# Patient Record
Sex: MALE | Race: WHITE | NOT HISPANIC OR LATINO | Employment: OTHER | URBAN - NONMETROPOLITAN AREA
[De-identification: names, ages, dates, MRNs, and addresses within clinical notes are randomized per-mention and may not be internally consistent; named-entity substitution may affect disease eponyms.]

---

## 2022-11-14 PROCEDURE — 93308 TTE F-UP OR LMTD: CPT | Performed by: INTERNAL MEDICINE

## 2022-11-14 PROCEDURE — 93321 DOPPLER ECHO F-UP/LMTD STD: CPT | Performed by: INTERNAL MEDICINE

## 2022-11-14 PROCEDURE — 93325 DOPPLER ECHO COLOR FLOW MAPG: CPT | Performed by: INTERNAL MEDICINE

## 2022-11-18 ENCOUNTER — OUTSIDE FACILITY SERVICE (OUTPATIENT)
Dept: CARDIOLOGY | Facility: CLINIC | Age: 67
End: 2022-11-18

## 2022-12-01 ENCOUNTER — OUTSIDE FACILITY SERVICE (OUTPATIENT)
Dept: CARDIOLOGY | Facility: CLINIC | Age: 67
End: 2022-12-01

## 2022-12-01 PROCEDURE — 93227 XTRNL ECG REC<48 HR R&I: CPT | Performed by: INTERNAL MEDICINE

## 2023-01-23 ENCOUNTER — OFFICE VISIT (OUTPATIENT)
Dept: CARDIOLOGY | Facility: CLINIC | Age: 68
End: 2023-01-23
Payer: MEDICARE

## 2023-01-23 VITALS
SYSTOLIC BLOOD PRESSURE: 172 MMHG | DIASTOLIC BLOOD PRESSURE: 94 MMHG | WEIGHT: 315 LBS | HEART RATE: 72 BPM | BODY MASS INDEX: 40.43 KG/M2 | HEIGHT: 74 IN

## 2023-01-23 DIAGNOSIS — I48.19 ATRIAL FIBRILLATION, PERSISTENT: Primary | ICD-10-CM

## 2023-01-23 DIAGNOSIS — E11.8 DM (DIABETES MELLITUS), TYPE 2 WITH COMPLICATIONS: ICD-10-CM

## 2023-01-23 DIAGNOSIS — E66.01 MORBID OBESITY WITH BMI OF 45.0-49.9, ADULT: ICD-10-CM

## 2023-01-23 DIAGNOSIS — E78.5 HYPERLIPIDEMIA LDL GOAL <100: ICD-10-CM

## 2023-01-23 DIAGNOSIS — R06.02 SHORTNESS OF BREATH: ICD-10-CM

## 2023-01-23 DIAGNOSIS — I20.8 ANGINAL EQUIVALENT: ICD-10-CM

## 2023-01-23 DIAGNOSIS — I25.6 SILENT MYOCARDIAL ISCHEMIA: ICD-10-CM

## 2023-01-23 DIAGNOSIS — R94.31 ABNORMAL EKG: ICD-10-CM

## 2023-01-23 DIAGNOSIS — I10 PRIMARY HYPERTENSION: ICD-10-CM

## 2023-01-23 PROCEDURE — 93000 ELECTROCARDIOGRAM COMPLETE: CPT | Performed by: NURSE PRACTITIONER

## 2023-01-23 PROCEDURE — 99204 OFFICE O/P NEW MOD 45 MIN: CPT | Performed by: NURSE PRACTITIONER

## 2023-01-23 RX ORDER — FUROSEMIDE 40 MG/1
40 TABLET ORAL DAILY
COMMUNITY

## 2023-01-23 RX ORDER — AMLODIPINE BESYLATE 10 MG/1
10 TABLET ORAL DAILY
Qty: 90 TABLET | Refills: 3 | Status: SHIPPED | OUTPATIENT
Start: 2023-01-23

## 2023-01-23 RX ORDER — IRBESARTAN 300 MG/1
300 TABLET ORAL NIGHTLY
COMMUNITY

## 2023-01-23 RX ORDER — ASPIRIN 81 MG/1
81 TABLET ORAL DAILY
COMMUNITY

## 2023-01-23 RX ORDER — LOVASTATIN 40 MG/1
40 TABLET ORAL NIGHTLY
COMMUNITY

## 2023-01-23 RX ORDER — TAMSULOSIN HYDROCHLORIDE 0.4 MG/1
1 CAPSULE ORAL DAILY
COMMUNITY

## 2023-01-23 RX ORDER — SOTALOL HYDROCHLORIDE 80 MG/1
TABLET ORAL
Qty: 180 TABLET | Refills: 2 | Status: SHIPPED | OUTPATIENT
Start: 2023-01-23 | End: 2023-03-27 | Stop reason: DRUGHIGH

## 2023-01-23 RX ORDER — SPIRONOLACTONE 25 MG/1
25 TABLET ORAL DAILY
Qty: 90 TABLET | Refills: 3 | Status: SHIPPED | OUTPATIENT
Start: 2023-01-23

## 2023-01-23 RX ORDER — CARVEDILOL 25 MG/1
25 TABLET ORAL 2 TIMES DAILY WITH MEALS
COMMUNITY
End: 2023-01-23

## 2023-01-23 RX ORDER — AMLODIPINE BESYLATE 5 MG/1
5 TABLET ORAL DAILY
COMMUNITY
End: 2023-01-23

## 2023-01-23 NOTE — LETTER
January 23, 2023     Mandie Anaya MD  [address]  [facility] [address]    Patient: Sourav Daley   YOB: 1955   Date of Visit: 1/23/2023       Dear Mandie Anaya MD    Sourav Daley was in my office today. Below is a copy of my note.    If you have questions, please do not hesitate to call me. I look forward to following Sourav along with you.         Sincerely,        RADHA Ramirez        CC: No Recipients    Chief Complaint   Patient presents with   • Establish Care     Pt is here to establish care.  He states he had COVID in November 2022.  He states he developed a-fib afterwards.  Pt states he does have SOB, with activity.  He denies CP, SOB or dizziness.  Pt does take a daily ASA.   • Cardiac History     Pt denies ever being evaluated by cardiology.  He had echo and  holter on 11/14/22.     • Lab Work     Last labs 11/8/22       Cardiac Complaints  dyspnea      Subjective    Sourav Daley is a 67 y.o. male with PAF, HTN, DM, and hyperlipidemia. He is referred today after he developed atrial fibrillation in November of 2022, eliquis initiated. At that time, 2 day holter was worn, which revealed 100% atrial fibrillation burden with several runs of afib with RVR. Echo done at [facility] showed EF of 65%, diastolic dysfunction, no effusion, and LA enlargement. He does report some SOA with over exertion but denies cardiac concerns with normal activity. CP, dizziness, syncope denied.  He states he can not feel that his heart is out of rhythm. Wife who accompanies him admits he snores quite a bit and will often wake up gasping. He denies any sleep apnea workup. No prior cardiac workup as been done. He does admit his mother had history of HTN and CVA. Patient reports he has hard to control blood pressure for many years. Labs done with PCP 08/2022 revealed: , Creatinine 0.88, GFR >60, Na 139, K 4.2, HH 13.9/40.9, Platelet 240, TRIG 167, HDL 32, LDL 62, PSA 5.1, TSH 4.38. Bleed and  bruise denied on current eliquis therapy. Also reports use of daily ASA without problems.         Cardiac History  Past Surgical History:   Procedure Laterality Date   • APPENDECTOMY     • CONVERTED (HISTORICAL) HOLTER  11/14/2022    Baseline afib, AVR HR 84, mult runs of afib with RVR, no pausing   • ECHO - CONVERTED  11/14/2022    EF 65%, diastolic dysfunction, LA enlargement, no effusion   • ORIF FINGER / THUMB FRACTURE     • TOE SURGERY Left    • TONSILLECTOMY         Current Outpatient Medications   Medication Sig Dispense Refill   • apixaban (ELIQUIS) 5 MG tablet tablet Take 5 mg by mouth 2 (Two) Times a Day.     • aspirin 81 MG EC tablet Take 81 mg by mouth Daily.     • Ferrous Sulfate (Iron) 28 MG tablet Take  by mouth.     • furosemide (LASIX) 40 MG tablet Take 40 mg by mouth Daily.     • irbesartan (AVAPRO) 300 MG tablet Take 300 mg by mouth Every Night.     • lovastatin (MEVACOR) 40 MG tablet Take 40 mg by mouth Every Night.     • metFORMIN (GLUCOPHAGE) 500 MG tablet Take 500 mg by mouth 2 (Two) Times a Day With Meals.     • tamsulosin (FLOMAX) 0.4 MG capsule 24 hr capsule Take 1 capsule by mouth Daily.     • amLODIPine (NORVASC) 10 MG tablet Take 1 tablet by mouth Daily. 90 tablet 3   • sotalol (Betapace) 80 MG tablet 1/2 tablet  tablet 2   • spironolactone (ALDACTONE) 25 MG tablet Take 1 tablet by mouth Daily. 90 tablet 3     No current facility-administered medications for this visit.       Patient has no known allergies.    Past Medical History:   Diagnosis Date   • Diabetes (HCC)    • HLD (hyperlipidemia)    • HTN (hypertension)        Social History     Socioeconomic History   • Marital status:    Tobacco Use   • Smoking status: Never   • Smokeless tobacco: Never   Vaping Use   • Vaping Use: Never used   Substance and Sexual Activity   • Alcohol use: Never   • Drug use: Never       Family History   Problem Relation Age of Onset   • Hypertension Mother    • Stroke Mother    • Dementia  "Mother    • Prostate cancer Father    • No Known Problems Sister    • No Known Problems Sister    • No Known Problems Brother    • No Known Problems Brother    • Diabetes Daughter    • No Known Problems Daughter    • No Known Problems Daughter        Review of Systems   Constitutional: Positive for malaise/fatigue. Negative for night sweats.   Cardiovascular: Positive for dyspnea on exertion. Negative for chest pain, irregular heartbeat, leg swelling, near-syncope, orthopnea, palpitations and syncope.   Respiratory: Negative for cough, shortness of breath and wheezing.    Musculoskeletal: Positive for arthritis, back pain, joint pain and stiffness.   Gastrointestinal: Negative for anorexia, heartburn, nausea and vomiting.   Genitourinary: Negative for dysuria, hematuria, hesitancy and nocturia.   Neurological: Negative for dizziness, headaches and light-headedness.   Psychiatric/Behavioral: Negative for depression and memory loss. The patient is not nervous/anxious.            Objective      /94 (BP Location: Right arm, Patient Position: Sitting)   Pulse 72   Ht 188 cm (74\")   Wt (!) 166 kg (365 lb 3.2 oz)   BMI 46.89 kg/m²     Constitutional:       Appearance: Not in distress.   Eyes:      Pupils: Pupils are equal, round, and reactive to light.   HENT:      Nose: Nose normal.   Pulmonary:      Effort: Pulmonary effort is normal.      Breath sounds: Normal breath sounds.   Cardiovascular:      PMI at left midclavicular line. Normal rate. Irregular rhythm.      Murmurs: There is a systolic murmur.   Abdominal:      Palpations: Abdomen is soft.   Musculoskeletal: Normal range of motion.      Cervical back: Normal range of motion and neck supple. Skin:     General: Skin is warm and dry.   Neurological:      Mental Status: Alert.           ECG 12 Lead    Date/Time: 1/23/2023 5:03 PM  Performed by: Geraldine Mayfield APRN  Authorized by: Geraldine Mayfield APRN   Previous ECG: no previous ECG available  Rhythm: " atrial fibrillation  BPM: 72  Other findings comments: q wave V3/V4    Clinical impression: abnormal EKG  Comments: Normal QT                Diagnoses and all orders for this visit:    1. Atrial fibrillation, persistent (HCC) (Primary)  -     ECG 12 Lead; Future  -     Stress Test With Myocardial Perfusion One Day; Future    2. Abnormal EKG  -     Stress Test With Myocardial Perfusion One Day; Future    3. Shortness of breath  -     Stress Test With Myocardial Perfusion One Day; Future    4. Silent myocardial ischemia  -     Stress Test With Myocardial Perfusion One Day; Future    5. Anginal equivalent (HCC)  -     Stress Test With Myocardial Perfusion One Day; Future    6. Primary hypertension    7. Hyperlipidemia LDL goal <100    8. DM (diabetes mellitus), type 2 with complications ([facility])    9. Morbid obesity with BMI of 45.0-49.9, adult ([facility])    Other orders  -     sotalol (Betapace) 80 MG tablet; 1/2 tablet BID  Dispense: 180 tablet; Refill: 2  -     amLODIPine (NORVASC) 10 MG tablet; Take 1 tablet by mouth Daily.  Dispense: 90 tablet; Refill: 3  -     spironolactone (ALDACTONE) 25 MG tablet; Take 1 tablet by mouth Daily.  Dispense: 90 tablet; Refill: 3  -     ECG 12 Lead            Afib: EKG done today showed an atrial fibrillation with controlled rate and ? q wave in V3/V4 and normal QT. Stress testing will be recommended to assess for any ischemia causing rhythm concerns. 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 ASA continued, more recommendations to follow stress. Coreg therapy discontinued in favor of sotalol at 40mg BID. Outpatient EKG to be done with your office on Thursday. Please fax to our office for review. Eliquis continued at same.     HTN: BP is not at goal. We will urge to increase norvasc therapy to 10mg daily. Avapro continued at same. We will add aldactone therapy at 25mg daily. Limited sodium urged. STRONGLY  recommended to discuss overnight oximetry with you as he has many risk factors for sleep apnea, and this could be a contributing factor in his hard to control BP and his arrhythmia. Will discuss with you on Thursday.     Hyperlipidemia: Recent labs showed lipids at goal. Will continue on same mevacor therapy. Limited carb diet advised.     Diastolic dysfunction: Will continue with lasix therapy. Limited sodium urged. Edema denied.     DM: No recent AIC available. He does admit AIC higher in the past, but no recent available. He is on glucophage therapy and tolerates well. Limited carb diet urged. Was also encouraged to discuss ozempic or monjouro therapy with you as this would aid in weight loss as BMI is markedly elevated at over 40.    BMI noted at 46.89, good cardiac ADA diet advised.     6 month follow up advised, or sooner if needed.       Problems Addressed this Visit        Cardiac and Vasculature    Atrial fibrillation, persistent (HCC) - Primary    Relevant Medications    sotalol (Betapace) 80 MG tablet    amLODIPine (NORVASC) 10 MG tablet    Other Relevant Orders    ECG 12 Lead    Stress Test With Myocardial Perfusion One Day    Primary hypertension    Relevant Medications    irbesartan (AVAPRO) 300 MG tablet    furosemide (LASIX) 40 MG tablet    sotalol (Betapace) 80 MG tablet    amLODIPine (NORVASC) 10 MG tablet    spironolactone (ALDACTONE) 25 MG tablet    Hyperlipidemia LDL goal <100    Relevant Medications    lovastatin (MEVACOR) 40 MG tablet   Other Visit Diagnoses     Abnormal EKG        Relevant Orders    Stress Test With Myocardial Perfusion One Day    Shortness of breath        Relevant Orders    Stress Test With Myocardial Perfusion One Day    Silent myocardial ischemia        Relevant Medications    sotalol (Betapace) 80 MG tablet    amLODIPine (NORVASC) 10 MG tablet    Other Relevant Orders    Stress Test With Myocardial Perfusion One Day    Anginal equivalent (HCC)        Relevant Medications     sotalol (Betapace) 80 MG tablet    amLODIPine (NORVASC) 10 MG tablet    Other Relevant Orders    Stress Test With Myocardial Perfusion One Day    DM (diabetes mellitus), type 2 with complications (HCC)        Relevant Medications    metFORMIN (GLUCOPHAGE) 500 MG tablet    Morbid obesity with BMI of 45.0-49.9, adult (HCC)          Diagnoses       Codes Comments    Atrial fibrillation, persistent (HCC)    -  Primary ICD-10-CM: I48.19  ICD-9-CM: 427.31     Abnormal EKG     ICD-10-CM: R94.31  ICD-9-CM: 794.31     Shortness of breath     ICD-10-CM: R06.02  ICD-9-CM: 786.05     Silent myocardial ischemia     ICD-10-CM: I25.6  ICD-9-CM: 414.8     Anginal equivalent (HCC)     ICD-10-CM: I20.8  ICD-9-CM: 413.9     Primary hypertension     ICD-10-CM: I10  ICD-9-CM: 401.9     Hyperlipidemia LDL goal <100     ICD-10-CM: E78.5  ICD-9-CM: 272.4     DM (diabetes mellitus), type 2 with complications (HCC)     ICD-10-CM: E11.8  ICD-9-CM: 250.90     Morbid obesity with BMI of 45.0-49.9, adult (HCC)     ICD-10-CM: E66.01, Z68.42  ICD-9-CM: 278.01, V85.42                        Electronically signed by RADHA Ramirez January 23, 2023 17:05 EST

## 2023-01-23 NOTE — LETTER
January 23, 2023     RADHA Lynn  [address]  [facility] [address]    Patient: Sourav Daley   YOB: 1955   Date of Visit: 1/23/2023       Dear RADHA Lynn    Sourav Daley was in my office today. Below is a copy of my note.    If you have questions, please do not hesitate to call me. I look forward to following Sourav along with you.         Sincerely,        RADHA Ramirez        CC: No Recipients    Chief Complaint   Patient presents with   • Establish Care     Pt is here to establish care.  He states he had COVID in November 2022.  He states he developed a-fib afterwards.  Pt states he does have SOB, with activity.  He denies CP, SOB or dizziness.  Pt does take a daily ASA.   • Cardiac History     Pt denies ever being evaluated by cardiology.  He had echo and  holter on 11/14/22.     • Lab Work     Last labs 11/8/22       Cardiac Complaints  dyspnea      Subjective    Sourav Daley is a 67 y.o. male with PAF, HTN, DM, and hyperlipidemia. He is referred today after he developed atrial fibrillation in November of 2022, eliquis initiated. At that time, 2 day holter was worn, which revealed 100% atrial fibrillation burden with several runs of afib with RVR. Echo done at [facility] showed EF of 65%, diastolic dysfunction, no effusion, and LA enlargement. He does report some SOA with over exertion but denies cardiac concerns with normal activity. CP, dizziness, syncope denied.  He states he can not feel that his heart is out of rhythm. Wife who accompanies him admits he snores quite a bit and will often wake up gasping. He denies any sleep apnea workup. No prior cardiac workup as been done. He does admit his mother had history of HTN and CVA. Patient reports he has hard to control blood pressure for many years. Labs done with PCP 08/2022 revealed: , Creatinine 0.88, GFR >60, Na 139, K 4.2, HH 13.9/40.9, Platelet 240, TRIG 167, HDL 32, LDL 62, PSA 5.1, TSH 4.38. Bleed and bruise  denied on current eliquis therapy. Also reports use of daily ASA without problems.         Cardiac History  Past Surgical History:   Procedure Laterality Date   • APPENDECTOMY     • CONVERTED (HISTORICAL) HOLTER  11/14/2022    Baseline afib, AVR HR 84, mult runs of afib with RVR, no pausing   • ECHO - CONVERTED  11/14/2022    EF 65%, diastolic dysfunction, LA enlargement, no effusion   • ORIF FINGER / THUMB FRACTURE     • TOE SURGERY Left    • TONSILLECTOMY         Current Outpatient Medications   Medication Sig Dispense Refill   • apixaban (ELIQUIS) 5 MG tablet tablet Take 5 mg by mouth 2 (Two) Times a Day.     • aspirin 81 MG EC tablet Take 81 mg by mouth Daily.     • Ferrous Sulfate (Iron) 28 MG tablet Take  by mouth.     • furosemide (LASIX) 40 MG tablet Take 40 mg by mouth Daily.     • irbesartan (AVAPRO) 300 MG tablet Take 300 mg by mouth Every Night.     • lovastatin (MEVACOR) 40 MG tablet Take 40 mg by mouth Every Night.     • metFORMIN (GLUCOPHAGE) 500 MG tablet Take 500 mg by mouth 2 (Two) Times a Day With Meals.     • tamsulosin (FLOMAX) 0.4 MG capsule 24 hr capsule Take 1 capsule by mouth Daily.     • amLODIPine (NORVASC) 10 MG tablet Take 1 tablet by mouth Daily. 90 tablet 3   • sotalol (Betapace) 80 MG tablet 1/2 tablet  tablet 2   • spironolactone (ALDACTONE) 25 MG tablet Take 1 tablet by mouth Daily. 90 tablet 3     No current facility-administered medications for this visit.       Patient has no known allergies.    Past Medical History:   Diagnosis Date   • Diabetes (HCC)    • HLD (hyperlipidemia)    • HTN (hypertension)        Social History     Socioeconomic History   • Marital status:    Tobacco Use   • Smoking status: Never   • Smokeless tobacco: Never   Vaping Use   • Vaping Use: Never used   Substance and Sexual Activity   • Alcohol use: Never   • Drug use: Never       Family History   Problem Relation Age of Onset   • Hypertension Mother    • Stroke Mother    • Dementia Mother   "  • Prostate cancer Father    • No Known Problems Sister    • No Known Problems Sister    • No Known Problems Brother    • No Known Problems Brother    • Diabetes Daughter    • No Known Problems Daughter    • No Known Problems Daughter        Review of Systems   Constitutional: Positive for malaise/fatigue. Negative for night sweats.   Cardiovascular: Positive for dyspnea on exertion. Negative for chest pain, irregular heartbeat, leg swelling, near-syncope, orthopnea, palpitations and syncope.   Respiratory: Negative for cough, shortness of breath and wheezing.    Musculoskeletal: Positive for arthritis, back pain, joint pain and stiffness.   Gastrointestinal: Negative for anorexia, heartburn, nausea and vomiting.   Genitourinary: Negative for dysuria, hematuria, hesitancy and nocturia.   Neurological: Negative for dizziness, headaches and light-headedness.   Psychiatric/Behavioral: Negative for depression and memory loss. The patient is not nervous/anxious.            Objective      /94 (BP Location: Right arm, Patient Position: Sitting)   Pulse 72   Ht 188 cm (74\")   Wt (!) 166 kg (365 lb 3.2 oz)   BMI 46.89 kg/m²     Constitutional:       Appearance: Not in distress.   Eyes:      Pupils: Pupils are equal, round, and reactive to light.   HENT:      Nose: Nose normal.   Pulmonary:      Effort: Pulmonary effort is normal.      Breath sounds: Normal breath sounds.   Cardiovascular:      PMI at left midclavicular line. Normal rate. Irregular rhythm.      Murmurs: There is a systolic murmur.   Abdominal:      Palpations: Abdomen is soft.   Musculoskeletal: Normal range of motion.      Cervical back: Normal range of motion and neck supple. Skin:     General: Skin is warm and dry.   Neurological:      Mental Status: Alert.           ECG 12 Lead    Date/Time: 1/23/2023 5:03 PM  Performed by: Geraldine Mayfield APRN  Authorized by: Geraldine Mayfield APRN   Previous ECG: no previous ECG available  Rhythm: atrial " fibrillation  BPM: 72  Other findings comments: q wave V3/V4    Clinical impression: abnormal EKG  Comments: Normal QT                Diagnoses and all orders for this visit:    1. Atrial fibrillation, persistent (HCC) (Primary)  -     ECG 12 Lead; Future  -     Stress Test With Myocardial Perfusion One Day; Future    2. Abnormal EKG  -     Stress Test With Myocardial Perfusion One Day; Future    3. Shortness of breath  -     Stress Test With Myocardial Perfusion One Day; Future    4. Silent myocardial ischemia  -     Stress Test With Myocardial Perfusion One Day; Future    5. Anginal equivalent (HCC)  -     Stress Test With Myocardial Perfusion One Day; Future    6. Primary hypertension    7. Hyperlipidemia LDL goal <100    8. DM (diabetes mellitus), type 2 with complications ([facility])    9. Morbid obesity with BMI of 45.0-49.9, adult ([facility])    Other orders  -     sotalol (Betapace) 80 MG tablet; 1/2 tablet BID  Dispense: 180 tablet; Refill: 2  -     amLODIPine (NORVASC) 10 MG tablet; Take 1 tablet by mouth Daily.  Dispense: 90 tablet; Refill: 3  -     spironolactone (ALDACTONE) 25 MG tablet; Take 1 tablet by mouth Daily.  Dispense: 90 tablet; Refill: 3  -     ECG 12 Lead            Afib: EKG done today showed an atrial fibrillation with controlled rate and ? q wave in V3/V4 and normal QT. Stress testing will be recommended to assess for any ischemia causing rhythm concerns. 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 ASA continued, more recommendations to follow stress. Coreg therapy discontinued in favor of sotalol at 40mg BID. Outpatient EKG to be done with your office on Thursday. Please fax to our office for review. Eliquis continued at same.     HTN: BP is not at goal. We will urge to increase norvasc therapy to 10mg daily. Avapro continued at same. We will add aldactone therapy at 25mg daily. Limited sodium urged. STRONGLY recommended to  discuss overnight oximetry with you as he has many risk factors for sleep apnea, and this could be a contributing factor in his hard to control BP and his arrhythmia. Will discuss with you on Thursday.     Hyperlipidemia: Recent labs showed lipids at goal. Will continue on same mevacor therapy. Limited carb diet advised.     Diastolic dysfunction: Will continue with lasix therapy. Limited sodium urged. Edema denied.     DM: No recent AIC available. He does admit AIC higher in the past, but no recent available. He is on glucophage therapy and tolerates well. Limited carb diet urged. Was also encouraged to discuss ozempic or monjouro therapy with you as this would aid in weight loss as BMI is markedly elevated at over 40.    BMI noted at 46.89, good cardiac ADA diet advised.     6 month follow up advised, or sooner if needed.       Problems Addressed this Visit        Cardiac and Vasculature    Atrial fibrillation, persistent (HCC) - Primary    Relevant Medications    sotalol (Betapace) 80 MG tablet    amLODIPine (NORVASC) 10 MG tablet    Other Relevant Orders    ECG 12 Lead    Stress Test With Myocardial Perfusion One Day    Primary hypertension    Relevant Medications    irbesartan (AVAPRO) 300 MG tablet    furosemide (LASIX) 40 MG tablet    sotalol (Betapace) 80 MG tablet    amLODIPine (NORVASC) 10 MG tablet    spironolactone (ALDACTONE) 25 MG tablet    Hyperlipidemia LDL goal <100    Relevant Medications    lovastatin (MEVACOR) 40 MG tablet   Other Visit Diagnoses     Abnormal EKG        Relevant Orders    Stress Test With Myocardial Perfusion One Day    Shortness of breath        Relevant Orders    Stress Test With Myocardial Perfusion One Day    Silent myocardial ischemia        Relevant Medications    sotalol (Betapace) 80 MG tablet    amLODIPine (NORVASC) 10 MG tablet    Other Relevant Orders    Stress Test With Myocardial Perfusion One Day    Anginal equivalent (HCC)        Relevant Medications    sotalol  (Betapace) 80 MG tablet    amLODIPine (NORVASC) 10 MG tablet    Other Relevant Orders    Stress Test With Myocardial Perfusion One Day    DM (diabetes mellitus), type 2 with complications (HCC)        Relevant Medications    metFORMIN (GLUCOPHAGE) 500 MG tablet    Morbid obesity with BMI of 45.0-49.9, adult (HCC)          Diagnoses       Codes Comments    Atrial fibrillation, persistent (HCC)    -  Primary ICD-10-CM: I48.19  ICD-9-CM: 427.31     Abnormal EKG     ICD-10-CM: R94.31  ICD-9-CM: 794.31     Shortness of breath     ICD-10-CM: R06.02  ICD-9-CM: 786.05     Silent myocardial ischemia     ICD-10-CM: I25.6  ICD-9-CM: 414.8     Anginal equivalent (HCC)     ICD-10-CM: I20.8  ICD-9-CM: 413.9     Primary hypertension     ICD-10-CM: I10  ICD-9-CM: 401.9     Hyperlipidemia LDL goal <100     ICD-10-CM: E78.5  ICD-9-CM: 272.4     DM (diabetes mellitus), type 2 with complications (HCC)     ICD-10-CM: E11.8  ICD-9-CM: 250.90     Morbid obesity with BMI of 45.0-49.9, adult (HCC)     ICD-10-CM: E66.01, Z68.42  ICD-9-CM: 278.01, V85.42                        Electronically signed by RADHA Ramirez January 23, 2023 17:05 EST

## 2023-01-23 NOTE — PROGRESS NOTES
Chief Complaint   Patient presents with   • Establish Care     Pt is here to establish care.  He states he had COVID in November 2022.  He states he developed a-fib afterwards.  Pt states he does have SOB, with activity.  He denies CP, SOB or dizziness.  Pt does take a daily ASA.   • Cardiac History     Pt denies ever being evaluated by cardiology.  He had echo and  holter on 11/14/22.     • Lab Work     Last labs 11/8/22       Cardiac Complaints  dyspnea      Subjective   Sourav Daley is a 67 y.o. male with PAF, HTN, DM, and hyperlipidemia. He is referred today after he developed atrial fibrillation in November of 2022, eliquis initiated. At that time, 2 day holter was worn, which revealed 100% atrial fibrillation burden with several runs of afib with RVR. Echo done at [facility] showed EF of 65%, diastolic dysfunction, no effusion, and LA enlargement. He does report some SOA with over exertion but denies cardiac concerns with normal activity. CP, dizziness, syncope denied.  He states he can not feel that his heart is out of rhythm. Wife who accompanies him admits he snores quite a bit and will often wake up gasping. He denies any sleep apnea workup. No prior cardiac workup as been done. He does admit his mother had history of HTN and CVA. Patient reports he has hard to control blood pressure for many years. Labs done with PCP 08/2022 revealed: , Creatinine 0.88, GFR >60, Na 139, K 4.2, HH 13.9/40.9, Platelet 240, TRIG 167, HDL 32, LDL 62, PSA 5.1, TSH 4.38. Bleed and bruise denied on current eliquis therapy. Also reports use of daily ASA without problems.         Cardiac History  Past Surgical History:   Procedure Laterality Date   • APPENDECTOMY     • CONVERTED (HISTORICAL) HOLTER  11/14/2022    Baseline afib, AVR HR 84, mult runs of afib with RVR, no pausing   • ECHO - CONVERTED  11/14/2022    EF 65%, diastolic dysfunction, LA enlargement, no effusion   • ORIF FINGER / THUMB FRACTURE     • TOE SURGERY Left    •  TONSILLECTOMY         Current Outpatient Medications   Medication Sig Dispense Refill   • apixaban (ELIQUIS) 5 MG tablet tablet Take 5 mg by mouth 2 (Two) Times a Day.     • aspirin 81 MG EC tablet Take 81 mg by mouth Daily.     • Ferrous Sulfate (Iron) 28 MG tablet Take  by mouth.     • furosemide (LASIX) 40 MG tablet Take 40 mg by mouth Daily.     • irbesartan (AVAPRO) 300 MG tablet Take 300 mg by mouth Every Night.     • lovastatin (MEVACOR) 40 MG tablet Take 40 mg by mouth Every Night.     • metFORMIN (GLUCOPHAGE) 500 MG tablet Take 500 mg by mouth 2 (Two) Times a Day With Meals.     • tamsulosin (FLOMAX) 0.4 MG capsule 24 hr capsule Take 1 capsule by mouth Daily.     • amLODIPine (NORVASC) 10 MG tablet Take 1 tablet by mouth Daily. 90 tablet 3   • sotalol (Betapace) 80 MG tablet 1/2 tablet  tablet 2   • spironolactone (ALDACTONE) 25 MG tablet Take 1 tablet by mouth Daily. 90 tablet 3     No current facility-administered medications for this visit.       Patient has no known allergies.    Past Medical History:   Diagnosis Date   • Diabetes (HCC)    • HLD (hyperlipidemia)    • HTN (hypertension)        Social History     Socioeconomic History   • Marital status:    Tobacco Use   • Smoking status: Never   • Smokeless tobacco: Never   Vaping Use   • Vaping Use: Never used   Substance and Sexual Activity   • Alcohol use: Never   • Drug use: Never       Family History   Problem Relation Age of Onset   • Hypertension Mother    • Stroke Mother    • Dementia Mother    • Prostate cancer Father    • No Known Problems Sister    • No Known Problems Sister    • No Known Problems Brother    • No Known Problems Brother    • Diabetes Daughter    • No Known Problems Daughter    • No Known Problems Daughter        Review of Systems   Constitutional: Positive for malaise/fatigue. Negative for night sweats.   Cardiovascular: Positive for dyspnea on exertion. Negative for chest pain, irregular heartbeat, leg swelling,  "near-syncope, orthopnea, palpitations and syncope.   Respiratory: Negative for cough, shortness of breath and wheezing.    Musculoskeletal: Positive for arthritis, back pain, joint pain and stiffness.   Gastrointestinal: Negative for anorexia, heartburn, nausea and vomiting.   Genitourinary: Negative for dysuria, hematuria, hesitancy and nocturia.   Neurological: Negative for dizziness, headaches and light-headedness.   Psychiatric/Behavioral: Negative for depression and memory loss. The patient is not nervous/anxious.            Objective     /94 (BP Location: Right arm, Patient Position: Sitting)   Pulse 72   Ht 188 cm (74\")   Wt (!) 166 kg (365 lb 3.2 oz)   BMI 46.89 kg/m²     Constitutional:       Appearance: Not in distress.   Eyes:      Pupils: Pupils are equal, round, and reactive to light.   HENT:      Nose: Nose normal.   Pulmonary:      Effort: Pulmonary effort is normal.      Breath sounds: Normal breath sounds.   Cardiovascular:      PMI at left midclavicular line. Normal rate. Irregular rhythm.      Murmurs: There is a systolic murmur.   Abdominal:      Palpations: Abdomen is soft.   Musculoskeletal: Normal range of motion.      Cervical back: Normal range of motion and neck supple. Skin:     General: Skin is warm and dry.   Neurological:      Mental Status: Alert.           ECG 12 Lead    Date/Time: 1/23/2023 5:03 PM  Performed by: Geraldine Mayfield APRN  Authorized by: Geraldine Mayfield APRN   Previous ECG: no previous ECG available  Rhythm: atrial fibrillation  BPM: 72  Other findings comments: q wave V3/V4    Clinical impression: abnormal EKG  Comments: Normal QT                 Diagnoses and all orders for this visit:    1. Atrial fibrillation, persistent (HCC) (Primary)  -     ECG 12 Lead; Future  -     Stress Test With Myocardial Perfusion One Day; Future    2. Abnormal EKG  -     Stress Test With Myocardial Perfusion One Day; Future    3. Shortness of breath  -     Stress Test With " Myocardial Perfusion One Day; Future    4. Silent myocardial ischemia  -     Stress Test With Myocardial Perfusion One Day; Future    5. Anginal equivalent (HCC)  -     Stress Test With Myocardial Perfusion One Day; Future    6. Primary hypertension    7. Hyperlipidemia LDL goal <100    8. DM (diabetes mellitus), type 2 with complications (HCC)    9. Morbid obesity with BMI of 45.0-49.9, adult (HCC)    Other orders  -     sotalol (Betapace) 80 MG tablet; 1/2 tablet BID  Dispense: 180 tablet; Refill: 2  -     amLODIPine (NORVASC) 10 MG tablet; Take 1 tablet by mouth Daily.  Dispense: 90 tablet; Refill: 3  -     spironolactone (ALDACTONE) 25 MG tablet; Take 1 tablet by mouth Daily.  Dispense: 90 tablet; Refill: 3  -     ECG 12 Lead             Afib: EKG done today showed an atrial fibrillation with controlled rate and ? q wave in V3/V4 and normal QT. Stress testing will be recommended to assess for any ischemia causing rhythm concerns. 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 ASA continued, more recommendations to follow stress. Coreg therapy discontinued in favor of sotalol at 40mg BID. Outpatient EKG to be done with your office on Thursday. Please fax to our office for review. Eliquis continued at same.     HTN: BP is not at goal. We will urge to increase norvasc therapy to 10mg daily. Avapro continued at same. We will add aldactone therapy at 25mg daily. Limited sodium urged. STRONGLY recommended to discuss overnight oximetry with you as he has many risk factors for sleep apnea, and this could be a contributing factor in his hard to control BP and his arrhythmia. Will discuss with you on Thursday.     Hyperlipidemia: Recent labs showed lipids at goal. Will continue on same mevacor therapy. Limited carb diet advised.     Diastolic dysfunction: Will continue with lasix therapy. Limited sodium urged. Edema denied.     DM: No recent AIC  available. He does admit AIC higher in the past, but no recent available. He is on glucophage therapy and tolerates well. Limited carb diet urged. Was also encouraged to discuss ozempic or monjouro therapy with you as this would aid in weight loss as BMI is markedly elevated at over 40.    BMI noted at 46.89, good cardiac ADA diet advised.     6 month follow up advised, or sooner if needed.       Problems Addressed this Visit        Cardiac and Vasculature    Atrial fibrillation, persistent (HCC) - Primary    Relevant Medications    sotalol (Betapace) 80 MG tablet    amLODIPine (NORVASC) 10 MG tablet    Other Relevant Orders    ECG 12 Lead    Stress Test With Myocardial Perfusion One Day    Primary hypertension    Relevant Medications    irbesartan (AVAPRO) 300 MG tablet    furosemide (LASIX) 40 MG tablet    sotalol (Betapace) 80 MG tablet    amLODIPine (NORVASC) 10 MG tablet    spironolactone (ALDACTONE) 25 MG tablet    Hyperlipidemia LDL goal <100    Relevant Medications    lovastatin (MEVACOR) 40 MG tablet   Other Visit Diagnoses     Abnormal EKG        Relevant Orders    Stress Test With Myocardial Perfusion One Day    Shortness of breath        Relevant Orders    Stress Test With Myocardial Perfusion One Day    Silent myocardial ischemia        Relevant Medications    sotalol (Betapace) 80 MG tablet    amLODIPine (NORVASC) 10 MG tablet    Other Relevant Orders    Stress Test With Myocardial Perfusion One Day    Anginal equivalent (HCC)        Relevant Medications    sotalol (Betapace) 80 MG tablet    amLODIPine (NORVASC) 10 MG tablet    Other Relevant Orders    Stress Test With Myocardial Perfusion One Day    DM (diabetes mellitus), type 2 with complications (HCC)        Relevant Medications    metFORMIN (GLUCOPHAGE) 500 MG tablet    Morbid obesity with BMI of 45.0-49.9, adult (HCC)          Diagnoses       Codes Comments    Atrial fibrillation, persistent (HCC)    -  Primary ICD-10-CM: I48.19  ICD-9-CM: 427.31      Abnormal EKG     ICD-10-CM: R94.31  ICD-9-CM: 794.31     Shortness of breath     ICD-10-CM: R06.02  ICD-9-CM: 786.05     Silent myocardial ischemia     ICD-10-CM: I25.6  ICD-9-CM: 414.8     Anginal equivalent (HCC)     ICD-10-CM: I20.8  ICD-9-CM: 413.9     Primary hypertension     ICD-10-CM: I10  ICD-9-CM: 401.9     Hyperlipidemia LDL goal <100     ICD-10-CM: E78.5  ICD-9-CM: 272.4     DM (diabetes mellitus), type 2 with complications (HCC)     ICD-10-CM: E11.8  ICD-9-CM: 250.90     Morbid obesity with BMI of 45.0-49.9, adult (HCC)     ICD-10-CM: E66.01, Z68.42  ICD-9-CM: 278.01, V85.42                        Electronically signed by Geraldine Mayfield, RADHA January 23, 2023 17:05 EST

## 2023-01-26 ENCOUNTER — PATIENT ROUNDING (BHMG ONLY) (OUTPATIENT)
Dept: CARDIOLOGY | Facility: CLINIC | Age: 68
End: 2023-01-26
Payer: MEDICARE

## 2023-01-26 NOTE — PROGRESS NOTES
January 26, 2023    Hello, may I speak with Sourav Daley?    My name is Estephanie MERLOS    I am  with [facility]  [facility]  [address] [place] [address] [phone].    Before we get started may I verify your date of birth? 1955    I am calling to officially welcome you to our practice and ask about your recent visit. Is this a good time to talk? yes    Tell me about your visit with us. What things went well? got in quick everyone was nice and they acted like they knew what they were talking about, had a good visit  Treated real well        We're always looking for ways to make our patients' experiences even better. Do you have recommendations on ways we may improve?  no not on my visit     Overall were you satisfied with your first visit to our practice? Yes I was        I appreciate you taking the time to speak with me today. Is there anything else I can do for you? No-I dont guess so       Thank you, and have a great day.

## 2023-02-13 ENCOUNTER — HOSPITAL ENCOUNTER (OUTPATIENT)
Dept: CARDIOLOGY | Facility: HOSPITAL | Age: 68
Discharge: HOME | End: 2023-02-13
Payer: MEDICARE

## 2023-02-13 DIAGNOSIS — R06.02 SHORTNESS OF BREATH: ICD-10-CM

## 2023-02-13 DIAGNOSIS — R94.31 ABNORMAL EKG: ICD-10-CM

## 2023-02-13 DIAGNOSIS — I20.8 ANGINAL EQUIVALENT: ICD-10-CM

## 2023-02-13 DIAGNOSIS — I25.6 SILENT MYOCARDIAL ISCHEMIA: ICD-10-CM

## 2023-02-13 DIAGNOSIS — I48.19 ATRIAL FIBRILLATION, PERSISTENT: ICD-10-CM

## 2023-02-13 LAB
BH CV REST NUCLEAR ISOTOPE DOSE: 10 MCI
BH CV STRESS COMMENTS STAGE 1: NORMAL
BH CV STRESS DOSE REGADENOSON STAGE 1: 0.4
BH CV STRESS DURATION MIN STAGE 1: 0
BH CV STRESS DURATION SEC STAGE 1: 10
BH CV STRESS NUCLEAR ISOTOPE DOSE: 30 MCI
BH CV STRESS PROTOCOL 1: NORMAL
BH CV STRESS RECOVERY BP: NORMAL MMHG
BH CV STRESS RECOVERY HR: 82 BPM
BH CV STRESS STAGE 1: 1
LV EF NUC BP: 44 %
MAXIMAL PREDICTED HEART RATE: 153 BPM
PERCENT MAX PREDICTED HR: 67.32 %
STRESS BASELINE BP: NORMAL MMHG
STRESS BASELINE HR: 78 BPM
STRESS PERCENT HR: 79 %
STRESS POST PEAK BP: NORMAL MMHG
STRESS POST PEAK HR: 103 BPM
STRESS TARGET HR: 130 BPM

## 2023-02-13 PROCEDURE — 93017 CV STRESS TEST TRACING ONLY: CPT

## 2023-02-13 PROCEDURE — 25010000002 REGADENOSON 0.4 MG/5ML SOLUTION: Performed by: INTERNAL MEDICINE

## 2023-02-13 PROCEDURE — A9500 TC99M SESTAMIBI: HCPCS | Performed by: INTERNAL MEDICINE

## 2023-02-13 PROCEDURE — 78452 HT MUSCLE IMAGE SPECT MULT: CPT

## 2023-02-13 PROCEDURE — 93018 CV STRESS TEST I&R ONLY: CPT | Performed by: INTERNAL MEDICINE

## 2023-02-13 PROCEDURE — 0 TECHNETIUM SESTAMIBI: Performed by: INTERNAL MEDICINE

## 2023-02-13 PROCEDURE — 78452 HT MUSCLE IMAGE SPECT MULT: CPT | Performed by: INTERNAL MEDICINE

## 2023-02-13 RX ADMIN — REGADENOSON 0.4 MG: 0.08 INJECTION, SOLUTION INTRAVENOUS at 13:32

## 2023-02-13 RX ADMIN — TECHNETIUM TC 99M SESTAMIBI 1 DOSE: 1 INJECTION INTRAVENOUS at 13:32

## 2023-02-13 RX ADMIN — TECHNETIUM TC 99M SESTAMIBI 1 DOSE: 1 INJECTION INTRAVENOUS at 09:36

## 2023-02-14 ENCOUNTER — TELEPHONE (OUTPATIENT)
Dept: CARDIOLOGY | Facility: CLINIC | Age: 68
End: 2023-02-14
Payer: MEDICARE

## 2023-02-14 DIAGNOSIS — I48.0 PAF (PAROXYSMAL ATRIAL FIBRILLATION): Primary | ICD-10-CM

## 2023-02-14 NOTE — TELEPHONE ENCOUNTER
ECV order to be done around first week of March. Will have been on anticoagulant about 6 weeks by then.

## 2023-02-14 NOTE — PROGRESS NOTES
Afib noted on stress. No ischemia noted, EF 44%. T recommends ECV, possibly beginning of March, he will have been on NOAC for about 6 weeks at that time. Need EKG before hand if willing to do ECV to see if he has converted back into NSR. Continue sotalol.

## 2023-03-03 ENCOUNTER — OUTSIDE FACILITY SERVICE (OUTPATIENT)
Dept: CARDIOLOGY | Facility: CLINIC | Age: 68
End: 2023-03-03
Payer: MEDICARE

## 2023-03-03 PROCEDURE — 92960 CARDIOVERSION ELECTRIC EXT: CPT | Performed by: INTERNAL MEDICINE

## 2023-03-07 ENCOUNTER — TELEPHONE (OUTPATIENT)
Dept: CARDIOLOGY | Facility: CLINIC | Age: 68
End: 2023-03-07
Payer: MEDICARE

## 2023-03-07 ENCOUNTER — CLINICAL SUPPORT (OUTPATIENT)
Dept: CARDIOLOGY | Facility: CLINIC | Age: 68
End: 2023-03-07
Payer: MEDICARE

## 2023-03-07 DIAGNOSIS — I48.0 PAF (PAROXYSMAL ATRIAL FIBRILLATION): Primary | ICD-10-CM

## 2023-03-07 PROCEDURE — 93000 ELECTROCARDIOGRAM COMPLETE: CPT | Performed by: INTERNAL MEDICINE

## 2023-03-27 RX ORDER — SOTALOL HYDROCHLORIDE 80 MG/1
80 TABLET ORAL 2 TIMES DAILY
Qty: 180 TABLET | Refills: 3 | Status: SHIPPED | OUTPATIENT
Start: 2023-03-27

## 2023-03-27 NOTE — TELEPHONE ENCOUNTER
Patient's wife, Maritza, called asking for new script for sotalol 80 mg BID to be sent in to [facility] as patient was made aware to stay on that dosing on 03/07/23.       Script is pended to be sent to [facility] in [place].

## 2024-01-25 RX ORDER — SPIRONOLACTONE 25 MG/1
25 TABLET ORAL DAILY
Qty: 90 TABLET | Refills: 3 | OUTPATIENT
Start: 2024-01-25

## 2024-01-25 RX ORDER — AMLODIPINE BESYLATE 10 MG/1
10 TABLET ORAL DAILY
Qty: 90 TABLET | Refills: 3 | OUTPATIENT
Start: 2024-01-25

## 2024-01-29 ENCOUNTER — TELEPHONE (OUTPATIENT)
Dept: CARDIOLOGY | Facility: CLINIC | Age: 69
End: 2024-01-29
Payer: MEDICARE

## 2024-01-29 NOTE — TELEPHONE ENCOUNTER
Patient's wife, Maritza, left a VM.  Patient needs a follow up visit scheduled.   (I do not see a verbal release in chart, or I might be overlooking it).

## 2024-02-08 RX ORDER — AMLODIPINE BESYLATE 10 MG/1
10 TABLET ORAL DAILY
Qty: 20 TABLET | Refills: 0 | Status: SHIPPED | OUTPATIENT
Start: 2024-02-08

## 2024-02-08 RX ORDER — SPIRONOLACTONE 25 MG/1
25 TABLET ORAL DAILY
Qty: 20 TABLET | Refills: 0 | Status: SHIPPED | OUTPATIENT
Start: 2024-02-08

## 2024-02-08 NOTE — TELEPHONE ENCOUNTER
Patient has upcoming appointment in 20 days, patient and patient spouse came into office to get refills. Pended supply enough to make it to their upcoming appointment.

## 2024-02-28 ENCOUNTER — OFFICE VISIT (OUTPATIENT)
Dept: CARDIOLOGY | Facility: CLINIC | Age: 69
End: 2024-02-28
Payer: MEDICARE

## 2024-02-28 ENCOUNTER — TELEPHONE (OUTPATIENT)
Dept: CARDIOLOGY | Facility: CLINIC | Age: 69
End: 2024-02-28

## 2024-02-28 VITALS
BODY MASS INDEX: 40.43 KG/M2 | WEIGHT: 315 LBS | HEART RATE: 60 BPM | HEIGHT: 74 IN | SYSTOLIC BLOOD PRESSURE: 136 MMHG | DIASTOLIC BLOOD PRESSURE: 96 MMHG

## 2024-02-28 DIAGNOSIS — I48.0 PAF (PAROXYSMAL ATRIAL FIBRILLATION): Primary | ICD-10-CM

## 2024-02-28 DIAGNOSIS — I10 PRIMARY HYPERTENSION: ICD-10-CM

## 2024-02-28 DIAGNOSIS — E66.01 MORBID OBESITY WITH BMI OF 45.0-49.9, ADULT: ICD-10-CM

## 2024-02-28 DIAGNOSIS — E78.5 HYPERLIPIDEMIA LDL GOAL <100: ICD-10-CM

## 2024-02-28 DIAGNOSIS — Z79.899 LONG TERM CURRENT USE OF ANTIARRHYTHMIC DRUG: ICD-10-CM

## 2024-02-28 PROCEDURE — 3080F DIAST BP >= 90 MM HG: CPT | Performed by: NURSE PRACTITIONER

## 2024-02-28 PROCEDURE — 3075F SYST BP GE 130 - 139MM HG: CPT | Performed by: NURSE PRACTITIONER

## 2024-02-28 PROCEDURE — 93000 ELECTROCARDIOGRAM COMPLETE: CPT | Performed by: NURSE PRACTITIONER

## 2024-02-28 PROCEDURE — 99214 OFFICE O/P EST MOD 30 MIN: CPT | Performed by: NURSE PRACTITIONER

## 2024-02-28 RX ORDER — IRBESARTAN 300 MG/1
150 TABLET ORAL NIGHTLY
Qty: 30 TABLET | Refills: 6 | Status: SHIPPED | OUTPATIENT
Start: 2024-02-28 | End: 2024-02-28 | Stop reason: SDUPTHER

## 2024-02-28 RX ORDER — SOTALOL HYDROCHLORIDE 80 MG/1
80 TABLET ORAL 2 TIMES DAILY
Qty: 180 TABLET | Refills: 3 | Status: SHIPPED | OUTPATIENT
Start: 2024-02-28

## 2024-02-28 RX ORDER — FUROSEMIDE 40 MG/1
40 TABLET ORAL DAILY
Qty: 30 TABLET | Refills: 6 | Status: SHIPPED | OUTPATIENT
Start: 2024-02-28

## 2024-02-28 RX ORDER — NIACIN 500 MG
500 TABLET ORAL NIGHTLY
COMMUNITY

## 2024-02-28 RX ORDER — LOVASTATIN 40 MG/1
40 TABLET ORAL NIGHTLY
Qty: 90 TABLET | Refills: 3 | Status: SHIPPED | OUTPATIENT
Start: 2024-02-28

## 2024-02-28 RX ORDER — AMLODIPINE BESYLATE 10 MG/1
10 TABLET ORAL DAILY
Qty: 90 TABLET | Refills: 3 | Status: SHIPPED | OUTPATIENT
Start: 2024-02-28

## 2024-02-28 RX ORDER — SPIRONOLACTONE 25 MG/1
25 TABLET ORAL DAILY
Qty: 20 TABLET | Refills: 0 | Status: SHIPPED | OUTPATIENT
Start: 2024-02-28 | End: 2024-02-29 | Stop reason: SDUPTHER

## 2024-02-28 RX ORDER — FINASTERIDE 5 MG/1
5 TABLET, FILM COATED ORAL DAILY
COMMUNITY

## 2024-02-28 RX ORDER — IRBESARTAN 150 MG/1
150 TABLET ORAL NIGHTLY
Qty: 90 TABLET | Refills: 3 | Status: SHIPPED | OUTPATIENT
Start: 2024-02-28

## 2024-02-28 RX ORDER — AMLODIPINE BESYLATE 10 MG/1
10 TABLET ORAL DAILY
Qty: 20 TABLET | Refills: 6 | Status: SHIPPED | OUTPATIENT
Start: 2024-02-28 | End: 2024-02-28 | Stop reason: SDUPTHER

## 2024-02-28 NOTE — TELEPHONE ENCOUNTER
[facility] called, they received amlodipine script but only as quantity of #20.  New script sent.  Also received script for his irbesartan 300 mg 1/2 tab.  It is not scored and historically patient gets 150 mg tablet.  New script sent.

## 2024-02-28 NOTE — PROGRESS NOTES
Chief Complaint   Patient presents with    Follow-up     Pt is here for cardiac follow up.  Pt denies having chest pain, palpitations, SOA, and dizziness.       Med Refill     Pt needs refill on all meds, 90 day refills. [facility] in [place].     Labs Only     11/08/2022 last in chart. Had some recently at [facility].         HPI:  HPI   Sourav Daley is a 68 y.o. male with PAF, HTN, DM, and hyperlipidemia. He is referred 1/2023 after he developed atrial fibrillation in November of 2022, eliquis initiated. At that time, 2 day holter was worn, which revealed 100% atrial fibrillation burden with several runs of afib with RVR. Echo done at [facility] showed EF of 65%, diastolic dysfunction, no effusion, and LA enlargement. He does report some SOA with over exertion but denies cardiac concerns with normal activity. CP, dizziness, syncope denied.  He states he can not feel that his heart is out of rhythm. Wife who accompanied him admits he snores quite a bit and will often wake up gasping. He denies any sleep apnea workup. No prior cardiac workup had been done. He admitted his mother had history of HTN and CVA. Patient reports he has hard to control blood pressure for many years.  Bleeding and bruising denied on current eliquis therapy. Also reports use of daily ASA without problems.     He returns today for a follow up visit. Patient denies chest pain, pressure, palpitations, tightness, dizziness, shortness of air.  Labs managed with PCP and states he recently had some at the [facility] and they were reported as normal however I do not have a copy to review today.  He needs refills on cardiac medications.       Cardiac History:    Past Surgical History:   Procedure Laterality Date    APPENDECTOMY      CARDIOVASCULAR STRESS TEST  02/13/2023    Lexiscan- A.Fib. EF 44%. Apical Infarct Vs Thinning    CONVERTED (HISTORICAL) HOLTER  11/14/2022    Baseline afib, AVG HR 84, mult runs of afib with RVR,  no pausing    ECHO - CONVERTED  11/14/2022    EF 65%, diastolic dysfunction, LA enlargement, no effusion    ORIF FINGER / THUMB FRACTURE      TOE SURGERY Left     TONSILLECTOMY         Current Outpatient Medications   Medication Sig Dispense Refill    amLODIPine (NORVASC) 10 MG tablet Take 1 tablet by mouth Daily. 20 tablet 6    apixaban (ELIQUIS) 5 MG tablet tablet Take 1 tablet by mouth 2 (Two) Times a Day. 60 tablet 6    Ferrous Sulfate (Iron) 28 MG tablet Take  by mouth.      finasteride (PROSCAR) 5 MG tablet Take 1 tablet by mouth Daily.      furosemide (LASIX) 40 MG tablet Take 1 tablet by mouth Daily. 30 tablet 6    irbesartan (AVAPRO) 300 MG tablet Take 0.5 tablets by mouth Every Night. 30 tablet 6    lovastatin (MEVACOR) 40 MG tablet Take 1 tablet by mouth Every Night. 90 tablet 3    metFORMIN (GLUCOPHAGE) 500 MG tablet Take 1 tablet by mouth 2 (Two) Times a Day With Meals.      niacin 500 MG tablet Take 1 tablet by mouth Every Night.      Potassium 99 MG tablet Take  by mouth.      sotalol (Betapace) 80 MG tablet Take 1 tablet by mouth 2 (Two) Times a Day. 180 tablet 3    spironolactone (ALDACTONE) 25 MG tablet Take 1 tablet by mouth Daily. 20 tablet 0    tamsulosin (FLOMAX) 0.4 MG capsule 24 hr capsule Take 1 capsule by mouth Daily.       No current facility-administered medications for this visit.       Patient has no known allergies.    Past Medical History:   Diagnosis Date    Diabetes     HLD (hyperlipidemia)     HTN (hypertension)        Social History     Socioeconomic History    Marital status:    Tobacco Use    Smoking status: Never    Smokeless tobacco: Never   Vaping Use    Vaping Use: Never used   Substance and Sexual Activity    Alcohol use: Never    Drug use: Never       Family History   Problem Relation Age of Onset    Hypertension Mother     Stroke Mother     Dementia Mother     Prostate cancer Father     No Known Problems Sister     No Known Problems Sister     No Known Problems  "Brother     No Known Problems Brother     Diabetes Daughter     No Known Problems Daughter     No Known Problems Daughter        Vitals:   /96 (BP Location: Left arm, Patient Position: Sitting, Cuff Size: Adult)   Pulse 60   Ht 188 cm (74\")   Wt (!) 157 kg (346 lb)   BMI 44.42 kg/m²     Physical Exam  Vitals and nursing note reviewed.   Constitutional:       Appearance: He is obese.   Neck:      Vascular: No carotid bruit.   Cardiovascular:      Rate and Rhythm: Normal rate and regular rhythm.      Pulses: Normal pulses.      Heart sounds: Normal heart sounds. No murmur heard.     No friction rub. No gallop.   Pulmonary:      Effort: Pulmonary effort is normal.      Breath sounds: Normal breath sounds and air entry.   Musculoskeletal:      Right lower leg: No edema.      Left lower leg: No edema.   Skin:     General: Skin is warm and dry.      Capillary Refill: Capillary refill takes less than 2 seconds.   Neurological:      Mental Status: He is alert and oriented to person, place, and time.       ECG 12 Lead    Date/Time: 2/28/2024 10:39 AM  Performed by: Betty Patton APRN    Authorized by: Betty Patton APRN  Rhythm: sinus rhythm  Rate: normal  BPM: 60    Clinical impression: normal ECG  Comments: Qtc 458 ms         Assessment & Plan     Diagnoses and all orders for this visit:    1. PAF (paroxysmal atrial fibrillation) (Primary)    2. Primary hypertension    3. Hyperlipidemia LDL goal <100    4. Morbid obesity with BMI of 45.0-49.9, adult    Other orders  -     amLODIPine (NORVASC) 10 MG tablet; Take 1 tablet by mouth Daily.  Dispense: 20 tablet; Refill: 6  -     apixaban (ELIQUIS) 5 MG tablet tablet; Take 1 tablet by mouth 2 (Two) Times a Day.  Dispense: 60 tablet; Refill: 6  -     furosemide (LASIX) 40 MG tablet; Take 1 tablet by mouth Daily.  Dispense: 30 tablet; Refill: 6  -     irbesartan (AVAPRO) 300 MG tablet; Take 0.5 tablets by mouth Every Night.  Dispense: 30 tablet; Refill: 6  -     " lovastatin (MEVACOR) 40 MG tablet; Take 1 tablet by mouth Every Night.  Dispense: 90 tablet; Refill: 3  -     sotalol (Betapace) 80 MG tablet; Take 1 tablet by mouth 2 (Two) Times a Day.  Dispense: 180 tablet; Refill: 3  -     spironolactone (ALDACTONE) 25 MG tablet; Take 1 tablet by mouth Daily.  Dispense: 20 tablet; Refill: 0    PAF  - EKG NSR 60bpm Qtc 458 ms, rate control with normal rhythm  - Continue sotalol and Eliquis    HTN  - Blood pressure controlled  - Continue amlodipine, irbesartan    Hyperlipidemia  - He states labs were reported as normal at the [facility]  - Managed with PCP  - Continue lovastatin 40 mg    Obesity  - Recommend weight loss  - Recommend limiting sugar, decreasing carbohydrates, following a more Mediterranean type diet, exercise and glucose control      No changes made today, no testing recommended at this time, refills sent to pharmacy. . Please send copy of recent labs    Visit Diagnoses:    ICD-10-CM ICD-9-CM   1. PAF (paroxysmal atrial fibrillation)  I48.0 427.31   2. Primary hypertension  I10 401.9   3. Hyperlipidemia LDL goal <100  E78.5 272.4   4. Morbid obesity with BMI of 45.0-49.9, adult  E66.01 278.01    Z68.42 V85.42       Meds Ordered During Visit:  New Medications Ordered This Visit   Medications    amLODIPine (NORVASC) 10 MG tablet     Sig: Take 1 tablet by mouth Daily.     Dispense:  20 tablet     Refill:  6    apixaban (ELIQUIS) 5 MG tablet tablet     Sig: Take 1 tablet by mouth 2 (Two) Times a Day.     Dispense:  60 tablet     Refill:  6    furosemide (LASIX) 40 MG tablet     Sig: Take 1 tablet by mouth Daily.     Dispense:  30 tablet     Refill:  6    irbesartan (AVAPRO) 300 MG tablet     Sig: Take 0.5 tablets by mouth Every Night.     Dispense:  30 tablet     Refill:  6    lovastatin (MEVACOR) 40 MG tablet     Sig: Take 1 tablet by mouth Every Night.     Dispense:  90 tablet     Refill:  3    sotalol (Betapace) 80 MG tablet     Sig: Take 1  tablet by mouth 2 (Two) Times a Day.     Dispense:  180 tablet     Refill:  3    spironolactone (ALDACTONE) 25 MG tablet     Sig: Take 1 tablet by mouth Daily.     Dispense:  20 tablet     Refill:  0       Follow Up Appointment:   No follow-ups on file.           This document has been electronically signed by RADHA Rubi  February 28, 2024 10:37 EST    Dictated Utilizing Dragon Dictation: Part of this note may be an electronic transcription/translation of spoken language to printed text using the Dragon Dictation System.

## 2024-03-01 RX ORDER — SPIRONOLACTONE 25 MG/1
25 TABLET ORAL DAILY
Qty: 90 TABLET | Refills: 3 | Status: SHIPPED | OUTPATIENT
Start: 2024-03-01

## 2024-08-28 ENCOUNTER — OFFICE VISIT (OUTPATIENT)
Dept: CARDIOLOGY | Facility: CLINIC | Age: 69
End: 2024-08-28
Payer: MEDICARE

## 2024-08-28 VITALS
OXYGEN SATURATION: 98 % | DIASTOLIC BLOOD PRESSURE: 76 MMHG | HEIGHT: 74 IN | HEART RATE: 60 BPM | WEIGHT: 315 LBS | SYSTOLIC BLOOD PRESSURE: 138 MMHG | BODY MASS INDEX: 40.43 KG/M2

## 2024-08-28 DIAGNOSIS — I73.9 PVD (PERIPHERAL VASCULAR DISEASE) WITH CLAUDICATION: ICD-10-CM

## 2024-08-28 DIAGNOSIS — G47.39 OTHER SLEEP APNEA: ICD-10-CM

## 2024-08-28 DIAGNOSIS — R06.02 SHORTNESS OF BREATH: ICD-10-CM

## 2024-08-28 DIAGNOSIS — I48.0 PAF (PAROXYSMAL ATRIAL FIBRILLATION): Primary | ICD-10-CM

## 2024-08-28 DIAGNOSIS — E66.01 MORBID OBESITY WITH BMI OF 40.0-44.9, ADULT: ICD-10-CM

## 2024-08-28 DIAGNOSIS — I51.9 LV DYSFUNCTION: ICD-10-CM

## 2024-08-28 DIAGNOSIS — R53.1 WEAKNESS: ICD-10-CM

## 2024-08-28 DIAGNOSIS — E78.5 HYPERLIPIDEMIA LDL GOAL <100: ICD-10-CM

## 2024-08-28 DIAGNOSIS — I10 PRIMARY HYPERTENSION: ICD-10-CM

## 2024-08-28 RX ORDER — LOVASTATIN 40 MG
40 TABLET ORAL NIGHTLY
Qty: 90 TABLET | Refills: 3 | Status: SHIPPED | OUTPATIENT
Start: 2024-08-28

## 2024-08-28 RX ORDER — AMLODIPINE BESYLATE 10 MG/1
10 TABLET ORAL DAILY
Qty: 90 TABLET | Refills: 3 | Status: SHIPPED | OUTPATIENT
Start: 2024-08-28

## 2024-08-28 RX ORDER — FUROSEMIDE 40 MG
40 TABLET ORAL DAILY
Qty: 90 TABLET | Refills: 2 | Status: SHIPPED | OUTPATIENT
Start: 2024-08-28

## 2024-08-28 RX ORDER — SPIRONOLACTONE 25 MG/1
25 TABLET ORAL DAILY
Qty: 90 TABLET | Refills: 3 | Status: SHIPPED | OUTPATIENT
Start: 2024-08-28

## 2024-08-28 RX ORDER — IRBESARTAN 150 MG/1
150 TABLET ORAL NIGHTLY
Qty: 90 TABLET | Refills: 3 | Status: SHIPPED | OUTPATIENT
Start: 2024-08-28

## 2024-08-28 RX ORDER — SOTALOL HYDROCHLORIDE 80 MG/1
80 TABLET ORAL 2 TIMES DAILY
Qty: 180 TABLET | Refills: 3 | Status: SHIPPED | OUTPATIENT
Start: 2024-08-28

## 2024-08-28 RX ORDER — MIRABEGRON 50 MG/1
1 TABLET, FILM COATED, EXTENDED RELEASE ORAL DAILY
COMMUNITY
Start: 2024-08-12

## 2024-08-28 RX ORDER — SOTALOL HYDROCHLORIDE 80 MG/1
80 TABLET ORAL 2 TIMES DAILY
Qty: 180 TABLET | Refills: 3 | Status: SHIPPED | OUTPATIENT
Start: 2024-08-28 | End: 2024-08-28 | Stop reason: SDUPTHER

## 2024-08-28 NOTE — PROGRESS NOTES
Chief Complaint   Patient presents with    Follow-up     Pt is here for cardiac follow up. Denies CP, SOB, and palpations/dizziness.       Med Refill     Pt request 30 day refills to be sent to [facility].  Medications were verified by med list.      lab work     Pt states their last labs were 3 weeks ago with [facility]         Cardiac Complaints  claudication      Subjective   Sourav Daley is a 69 y.o. male with PAF, HTN, DM, sleep apnea, and hyperlipidemia. He is referred 1/2023 after he developed atrial fibrillation in November of 2022, eliquis initiated. At that time, 2 day holter was worn, which revealed 100% atrial fibrillation burden with several runs of afib with RVR. Echo done at [facility] showed EF of 65%, diastolic dysfunction, no effusion, and LA enlargement. He reported SOA with over exertion at consult, noted to in atrial fibrillation, sotalol was added. Echo showed EF of 44% on stress, patient in afib. He remained in afib and underwent cardioversion and has remained in NSR.     He comes today for follow up and new concerns are denied. No CP, SOA, palpitations, dizziness, or syncope noted. Is now wearing CPAP, tolerating well.  Labs with PCP, checked 3 months ago at [facility], admits AIC not well controlled, insulin added. Refills requested.          Cardiac History  Past Surgical History:   Procedure Laterality Date    APPENDECTOMY      CARDIOVASCULAR STRESS TEST  02/13/2023    Lexiscan- A.Fib. EF 44%. Apical Infarct Vs Thinning    CONVERTED (HISTORICAL) HOLTER  11/14/2022    Baseline afib, AVG HR 84, mult runs of afib with RVR, no pausing    ECHO - CONVERTED  11/14/2022    EF 65%, diastolic dysfunction, LA enlargement, no effusion    ORIF FINGER / THUMB FRACTURE      TOE SURGERY Left     TONSILLECTOMY         Current Outpatient Medications   Medication Sig Dispense Refill    amLODIPine (NORVASC) 10 MG tablet Take 1 tablet by mouth Daily. 90 tablet 3    apixaban (ELIQUIS) 5 MG tablet tablet  Take 1 tablet by mouth 2 (Two) Times a Day. 180 tablet 2    Ferrous Sulfate (Iron) 28 MG tablet Take  by mouth.      finasteride (PROSCAR) 5 MG tablet Take 1 tablet by mouth Daily.      furosemide (LASIX) 40 MG tablet Take 1 tablet by mouth Daily. 90 tablet 2    irbesartan (AVAPRO) 150 MG tablet Take 1 tablet by mouth Every Night. 90 tablet 3    lovastatin (MEVACOR) 40 MG tablet Take 1 tablet by mouth Every Night. 90 tablet 3    metFORMIN (GLUCOPHAGE) 500 MG tablet Take 1 tablet by mouth 2 (Two) Times a Day With Meals.      Myrbetriq 50 MG tablet sustained-release 24 hour 24 hr tablet Take 50 mg by mouth Daily.      niacin 500 MG tablet Take 1 tablet by mouth Every Night.      Potassium 99 MG tablet Take  by mouth.      sotalol (Betapace) 80 MG tablet Take 1 tablet by mouth 2 (Two) Times a Day. 180 tablet 3    spironolactone (ALDACTONE) 25 MG tablet Take 1 tablet by mouth Daily. 90 tablet 3    tamsulosin (FLOMAX) 0.4 MG capsule 24 hr capsule Take 1 capsule by mouth Daily.       No current facility-administered medications for this visit.       Patient has no known allergies.    Past Medical History:   Diagnosis Date    Diabetes     HLD (hyperlipidemia)     HTN (hypertension)        Social History     Socioeconomic History    Marital status:    Tobacco Use    Smoking status: Never    Smokeless tobacco: Never   Vaping Use    Vaping status: Never Used   Substance and Sexual Activity    Alcohol use: Never    Drug use: Never       Family History   Problem Relation Age of Onset    Hypertension Mother     Stroke Mother     Dementia Mother     Prostate cancer Father     No Known Problems Sister     No Known Problems Sister     No Known Problems Brother     No Known Problems Brother     Diabetes Daughter     No Known Problems Daughter     No Known Problems Daughter        Review of Systems   Constitutional: Negative for malaise/fatigue and night sweats.   Cardiovascular:  Positive for claudication. Negative for chest  "pain, dyspnea on exertion, irregular heartbeat, leg swelling, near-syncope, orthopnea, palpitations and syncope.   Respiratory:  Negative for cough, shortness of breath and wheezing.    Musculoskeletal:  Positive for muscle weakness and stiffness. Negative for falls.   Gastrointestinal:  Negative for anorexia, heartburn, nausea and vomiting.   Genitourinary:  Negative for dysuria, hematuria, hesitancy and nocturia.   Neurological:  Negative for dizziness, headaches and light-headedness.   Psychiatric/Behavioral:  Negative for depression and memory loss. The patient is not nervous/anxious.            Objective     /76   Pulse 60   Ht 188 cm (74\")   Wt (!) 157 kg (346 lb)   SpO2 98%   BMI 44.42 kg/m²     Constitutional:       Appearance: Not in distress.   Eyes:      Pupils: Pupils are equal, round, and reactive to light.   HENT:      Nose: Nose normal.   Pulmonary:      Effort: Pulmonary effort is normal.      Breath sounds: Normal breath sounds.   Cardiovascular:      PMI at left midclavicular line. Bradycardia present. Regular rhythm.      Murmurs: There is a systolic murmur.   Abdominal:      Palpations: Abdomen is soft.   Musculoskeletal: Normal range of motion.      Cervical back: Normal range of motion and neck supple. Skin:     General: Skin is warm and dry.   Neurological:      Mental Status: Alert.           ECG 12 Lead    Date/Time: 8/28/2024 9:45 AM  Performed by: Geraldine Mayfield APRN    Authorized by: Geraldine Mayfield APRN  Comparison: compared with previous ECG from 2/28/2024  Similar to previous ECG  Rhythm: sinus bradycardia  BPM: 57    Clinical impression: normal ECG  Comments: Normal QT               Diagnoses and all orders for this visit:    1. PAF (paroxysmal atrial fibrillation) (Primary)  -     ECG 12 Lead  -     Adult Transthoracic Echo Complete W/ Cont if Necessary Per Protocol; Future    2. Shortness of breath  -     Adult Transthoracic Echo Complete W/ Cont if Necessary Per " Protocol; Future    3. Weakness  -     US Ankle / Brachial Indices Extremity Limited; Future    4. PVD (peripheral vascular disease) with claudication  -     US Ankle / Brachial Indices Extremity Limited; Future    5. LV dysfunction  -     Adult Transthoracic Echo Complete W/ Cont if Necessary Per Protocol; Future    6. Primary hypertension    7. Hyperlipidemia LDL goal <100    8. Other sleep apnea    9. Morbid obesity with BMI of 40.0-44.9, adult    Other orders  -     Discontinue: sotalol (Betapace) 80 MG tablet; Take 1 tablet by mouth 2 (Two) Times a Day.  Dispense: 180 tablet; Refill: 3  -     amLODIPine (NORVASC) 10 MG tablet; Take 1 tablet by mouth Daily.  Dispense: 90 tablet; Refill: 3  -     apixaban (ELIQUIS) 5 MG tablet tablet; Take 1 tablet by mouth 2 (Two) Times a Day.  Dispense: 180 tablet; Refill: 2  -     furosemide (LASIX) 40 MG tablet; Take 1 tablet by mouth Daily.  Dispense: 90 tablet; Refill: 2  -     irbesartan (AVAPRO) 150 MG tablet; Take 1 tablet by mouth Every Night.  Dispense: 90 tablet; Refill: 3  -     lovastatin (MEVACOR) 40 MG tablet; Take 1 tablet by mouth Every Night.  Dispense: 90 tablet; Refill: 3  -     sotalol (Betapace) 80 MG tablet; Take 1 tablet by mouth 2 (Two) Times a Day.  Dispense: 180 tablet; Refill: 3  -     spironolactone (ALDACTONE) 25 MG tablet; Take 1 tablet by mouth Daily.  Dispense: 90 tablet; Refill: 3               PAF: EKG done today showed sinus hiren with normal QT. Will continue sotalol and eliquis, has remained in rhythm. Will repeat EKG to assess LV function. Continue eliquis for CHADs vasc of 4.    LV dysfunction: Will repeat echo to assess LV function now that heart is in rhythm.    HTN: BP is stable. Will continue with avapro, norvasc, aldactone,and lasix. Limited sodium urged.     Hyperlipidemia: On statin therapy with mevacor. FLP with your office. Can we have for review? Limited carb diet urged.    Claudication: Noted today. Will do TARA to assess for any  flow limiting stenosis.    DM: On DM med with glucophage. Also now on insulin. AIC not at goal. Encouraged to discuss mounjaro, wegovy with you.    Sleep apnea: Wearing CPAP therapy. Feeling improved.    Refills per request    BMI noted at 44.42, good cardiac ADA diet urged.    6 month follow up advised, sooner if needed.             Problems Addressed this Visit          Cardiac and Vasculature    Primary hypertension    Relevant Medications    amLODIPine (NORVASC) 10 MG tablet    furosemide (LASIX) 40 MG tablet    irbesartan (AVAPRO) 150 MG tablet    sotalol (Betapace) 80 MG tablet    spironolactone (ALDACTONE) 25 MG tablet    Hyperlipidemia LDL goal <100    Relevant Medications    lovastatin (MEVACOR) 40 MG tablet     Other Visit Diagnoses       PAF (paroxysmal atrial fibrillation)    -  Primary    Relevant Medications    amLODIPine (NORVASC) 10 MG tablet    sotalol (Betapace) 80 MG tablet    Other Relevant Orders    ECG 12 Lead    Adult Transthoracic Echo Complete W/ Cont if Necessary Per Protocol    Shortness of breath        Relevant Orders    Adult Transthoracic Echo Complete W/ Cont if Necessary Per Protocol    Weakness        Relevant Orders    US Ankle / Brachial Indices Extremity Limited    PVD (peripheral vascular disease) with claudication        Relevant Orders    US Ankle / Brachial Indices Extremity Limited    LV dysfunction        Relevant Medications    amLODIPine (NORVASC) 10 MG tablet    sotalol (Betapace) 80 MG tablet    Other Relevant Orders    Adult Transthoracic Echo Complete W/ Cont if Necessary Per Protocol    Other sleep apnea        Morbid obesity with BMI of 40.0-44.9, adult              Diagnoses         Codes Comments    PAF (paroxysmal atrial fibrillation)    -  Primary ICD-10-CM: I48.0  ICD-9-CM: 427.31     Shortness of breath     ICD-10-CM: R06.02  ICD-9-CM: 786.05     Weakness     ICD-10-CM: R53.1  ICD-9-CM: 780.79     PVD (peripheral vascular disease) with claudication      ICD-10-CM: I73.9  ICD-9-CM: 443.9     LV dysfunction     ICD-10-CM: I51.9  ICD-9-CM: 429.9     Primary hypertension     ICD-10-CM: I10  ICD-9-CM: 401.9     Hyperlipidemia LDL goal <100     ICD-10-CM: E78.5  ICD-9-CM: 272.4     Other sleep apnea     ICD-10-CM: G47.39  ICD-9-CM: 327.29     Morbid obesity with BMI of 40.0-44.9, adult     ICD-10-CM: E66.01, Z68.41  ICD-9-CM: 278.01, V85.41                     Electronically signed by Geraldine Mayfield, APRN August 28, 2024 11:03 EDT

## 2024-09-10 ENCOUNTER — OUTSIDE FACILITY SERVICE (OUTPATIENT)
Dept: CARDIOLOGY | Facility: CLINIC | Age: 69
End: 2024-09-10
Payer: MEDICARE

## 2025-03-10 ENCOUNTER — OFFICE VISIT (OUTPATIENT)
Dept: CARDIOLOGY | Facility: CLINIC | Age: 70
End: 2025-03-10
Payer: MEDICARE

## 2025-03-10 VITALS
HEIGHT: 74 IN | WEIGHT: 315 LBS | SYSTOLIC BLOOD PRESSURE: 150 MMHG | BODY MASS INDEX: 40.43 KG/M2 | HEART RATE: 59 BPM | DIASTOLIC BLOOD PRESSURE: 82 MMHG

## 2025-03-10 DIAGNOSIS — R60.0 EDEMA LEG: ICD-10-CM

## 2025-03-10 DIAGNOSIS — E66.01 MORBID OBESITY WITH BMI OF 45.0-49.9, ADULT: ICD-10-CM

## 2025-03-10 DIAGNOSIS — I51.9 LV DYSFUNCTION: ICD-10-CM

## 2025-03-10 DIAGNOSIS — G47.33 OSA (OBSTRUCTIVE SLEEP APNEA): ICD-10-CM

## 2025-03-10 DIAGNOSIS — E11.9 TYPE 2 DIABETES MELLITUS WITHOUT COMPLICATION, WITHOUT LONG-TERM CURRENT USE OF INSULIN: ICD-10-CM

## 2025-03-10 DIAGNOSIS — E78.5 HYPERLIPIDEMIA LDL GOAL <100: ICD-10-CM

## 2025-03-10 DIAGNOSIS — I10 PRIMARY HYPERTENSION: ICD-10-CM

## 2025-03-10 DIAGNOSIS — I48.0 PAF (PAROXYSMAL ATRIAL FIBRILLATION): Primary | ICD-10-CM

## 2025-03-10 PROCEDURE — 99214 OFFICE O/P EST MOD 30 MIN: CPT | Performed by: NURSE PRACTITIONER

## 2025-03-10 PROCEDURE — 93000 ELECTROCARDIOGRAM COMPLETE: CPT | Performed by: NURSE PRACTITIONER

## 2025-03-10 PROCEDURE — 3079F DIAST BP 80-89 MM HG: CPT | Performed by: NURSE PRACTITIONER

## 2025-03-10 PROCEDURE — 3077F SYST BP >= 140 MM HG: CPT | Performed by: NURSE PRACTITIONER

## 2025-03-10 RX ORDER — IRBESARTAN 150 MG/1
150 TABLET ORAL NIGHTLY
Qty: 90 TABLET | Refills: 3 | Status: SHIPPED | OUTPATIENT
Start: 2025-03-10

## 2025-03-10 RX ORDER — SPIRONOLACTONE 25 MG/1
25 TABLET ORAL DAILY
Qty: 90 TABLET | Refills: 3 | Status: SHIPPED | OUTPATIENT
Start: 2025-03-10

## 2025-03-10 RX ORDER — AMLODIPINE BESYLATE 10 MG/1
10 TABLET ORAL DAILY
Qty: 90 TABLET | Refills: 3 | Status: SHIPPED | OUTPATIENT
Start: 2025-03-10

## 2025-03-10 RX ORDER — LOVASTATIN 40 MG/1
40 TABLET ORAL NIGHTLY
Qty: 90 TABLET | Refills: 3 | Status: SHIPPED | OUTPATIENT
Start: 2025-03-10

## 2025-03-10 RX ORDER — SOTALOL HYDROCHLORIDE 80 MG/1
80 TABLET ORAL 2 TIMES DAILY
Qty: 180 TABLET | Refills: 3 | Status: SHIPPED | OUTPATIENT
Start: 2025-03-10

## 2025-03-10 RX ORDER — FUROSEMIDE 40 MG/1
40 TABLET ORAL EVERY OTHER DAY
Qty: 45 TABLET | Refills: 3 | Status: SHIPPED | OUTPATIENT
Start: 2025-03-10

## 2025-03-10 NOTE — PROGRESS NOTES
Chief Complaint   Patient presents with    Follow-up     For cardiac management. Patient is not on aspirin. Patient is not taking lasix. Last lab work was done in the fall of 2024, not in chart. We do have lipid panel from 07/25/24, in chart under labs.     Med Refill     Needs refills on cardiac medications. 90 day supplies to [facility] in [place]. Brought medication list with visit.        Cardiac Complaints  lower extremity edema      Subjective   Sourav Daley is a 69 y.o. male with PAF, HTN, DM, sleep apnea, and hyperlipidemia. He is referred 1/2023 after he developed atrial fibrillation in November of 2022, eliquis initiated. At that time, 2 day holter was worn, which revealed 100% atrial fibrillation burden with several runs of afib with RVR. Echo done at [facility] showed EF of 65%, diastolic dysfunction, no effusion, and LA enlargement. He reported SOA with over exertion at consult, noted to in atrial fibrillation, sotalol was added. Echo showed EF of 44% on stress, patient in afib. He remained in afib and underwent cardioversion and has remained in NSR. Repeat echo 2024 showed EF 55-60%, trace MR. TARA for claudication normal.    He comes today for follow up and new concerns are denied. No recent labs have been done. No CP, SOA, palpitations, dizziness,or syncope noted. He does admit to edema at times, but reports he is not taking his lasix routinely due to going to the bathroom. Refills requested.      Cardiac History  Past Surgical History:   Procedure Laterality Date    APPENDECTOMY      CARDIOVASCULAR STRESS TEST  02/13/2023    Lexiscan- A.Fib. EF 44%. Apical Infarct Vs Thinning    CARDIOVERSION  03/03/2023    Converted to sinus    CONVERTED (HISTORICAL) HOLTER  11/14/2022    Baseline afib, AVG HR 84, mult runs of afib with RVR, no pausing    DOPPLER ANKLE BRACHIAL INDEX MULTI LEVEL CAR Bilateral 09/01/2024    Normal    ECHO - CONVERTED  11/14/2022    @ [facility]. TLS. EF 65%. LA- 4.6. Mild MR & AI.  RVSP- 26 mmHg    ECHO - CONVERTED  09/10/2024    @ [facility]. EF ? 60%. Trace-Mild MR. RVSP- 25 mmHg    ORIF FINGER / THUMB FRACTURE      TOE SURGERY Left     TONSILLECTOMY         Current Outpatient Medications   Medication Sig Dispense Refill    amLODIPine (NORVASC) 10 MG tablet Take 1 tablet by mouth Daily. 90 tablet 3    apixaban (ELIQUIS) 5 MG tablet tablet Take 1 tablet by mouth 2 (Two) Times a Day. 180 tablet 2    Ferrous Sulfate (Iron) 28 MG tablet Take  by mouth.      finasteride (PROSCAR) 5 MG tablet Take 1 tablet by mouth Daily.      irbesartan (AVAPRO) 150 MG tablet Take 1 tablet by mouth Every Night. 90 tablet 3    lovastatin (MEVACOR) 40 MG tablet Take 1 tablet by mouth Every Night. 90 tablet 3    metFORMIN (GLUCOPHAGE) 500 MG tablet Take 1 tablet by mouth 2 (Two) Times a Day With Meals.      Myrbetriq 50 MG tablet sustained-release 24 hour 24 hr tablet Take 50 mg by mouth Daily.      niacin 500 MG tablet Take 1 tablet by mouth Every Night.      Potassium 99 MG tablet Take  by mouth.      sotalol (Betapace) 80 MG tablet Take 1 tablet by mouth 2 (Two) Times a Day. 180 tablet 3    spironolactone (ALDACTONE) 25 MG tablet Take 1 tablet by mouth Daily. 90 tablet 3    tamsulosin (FLOMAX) 0.4 MG capsule 24 hr capsule Take 1 capsule by mouth Daily.      furosemide (LASIX) 40 MG tablet Take 1 tablet by mouth Every Other Day. 45 tablet 3     No current facility-administered medications for this visit.       Patient has no known allergies.    Past Medical History:   Diagnosis Date    Diabetes     HLD (hyperlipidemia)     HTN (hypertension)        Social History     Socioeconomic History    Marital status:    Tobacco Use    Smoking status: Never    Smokeless tobacco: Never   Vaping Use    Vaping status: Never Used   Substance and Sexual Activity    Alcohol use: Never    Drug use: Never       Family History   Problem Relation Age of Onset    Hypertension Mother     Stroke Mother     Dementia Mother      "Prostate cancer Father     No Known Problems Sister     No Known Problems Sister     No Known Problems Brother     No Known Problems Brother     Diabetes Daughter     No Known Problems Daughter     No Known Problems Daughter        Review of Systems   Constitutional: Negative for malaise/fatigue and night sweats.   Cardiovascular:  Positive for leg swelling. Negative for chest pain, claudication, dyspnea on exertion, irregular heartbeat, orthopnea, palpitations and syncope.   Respiratory:  Negative for cough, shortness of breath and wheezing.    Musculoskeletal:  Negative for back pain, joint pain and stiffness.   Gastrointestinal:  Negative for anorexia, heartburn, nausea and vomiting.   Genitourinary:  Negative for dysuria, hematuria, hesitancy and nocturia.   Neurological:  Negative for dizziness, headaches and light-headedness.   Psychiatric/Behavioral:  Negative for depression and memory loss. The patient is not nervous/anxious.            Objective     /82 (BP Location: Right arm)   Pulse 59   Ht 188 cm (74.02\")   Wt (!) 160 kg (352 lb)   BMI 45.17 kg/m²     Constitutional:       Appearance: Not in distress.   Eyes:      Pupils: Pupils are equal, round, and reactive to light.   HENT:      Nose: Nose normal.   Pulmonary:      Effort: Pulmonary effort is normal.      Breath sounds: Normal breath sounds.   Cardiovascular:      PMI at left midclavicular line. Normal rate. Regular rhythm.      Murmurs: There is a systolic murmur.   Abdominal:      Palpations: Abdomen is soft.   Musculoskeletal: Normal range of motion.      Cervical back: Normal range of motion and neck supple. Skin:     General: Skin is warm and dry.   Neurological:      Mental Status: Alert.           ECG 12 Lead    Date/Time: 3/10/2025 9:27 AM  Performed by: Geraldine Mayfield APRN    Authorized by: Geraldine Mayfield APRN  Comparison: compared with previous ECG   Rhythm: sinus bradycardia  BPM: 59    Clinical impression: non-specific " ECG  Comments: Normal QT               Diagnoses and all orders for this visit:    1. PAF (paroxysmal atrial fibrillation) (Primary)  -     ECG 12 Lead    2. Primary hypertension    3. Hyperlipidemia LDL goal <100    4. LV dysfunction    5. Edema leg    6. MERT (obstructive sleep apnea)    7. Type 2 diabetes mellitus without complication, without long-term current use of insulin    8. Morbid obesity with BMI of 45.0-49.9, adult    Other orders  -     furosemide (LASIX) 40 MG tablet; Take 1 tablet by mouth Every Other Day.  Dispense: 45 tablet; Refill: 3  -     amLODIPine (NORVASC) 10 MG tablet; Take 1 tablet by mouth Daily.  Dispense: 90 tablet; Refill: 3  -     irbesartan (AVAPRO) 150 MG tablet; Take 1 tablet by mouth Every Night.  Dispense: 90 tablet; Refill: 3  -     lovastatin (MEVACOR) 40 MG tablet; Take 1 tablet by mouth Every Night.  Dispense: 90 tablet; Refill: 3  -     sotalol (Betapace) 80 MG tablet; Take 1 tablet by mouth 2 (Two) Times a Day.  Dispense: 180 tablet; Refill: 3  -     spironolactone (ALDACTONE) 25 MG tablet; Take 1 tablet by mouth Daily.  Dispense: 90 tablet; Refill: 3             PAF: EKG done today showed sinus hiren with normal QT. Will continue sotalol and eliquis, has remained in rhythm. Continue eliquis for CHADs vasc of 4.     LV dysfunction: Repeat echo after rhythm established showed EF improved to 55-60%.      HTN: BP is stable. Will continue with avapro, norvasc, aldactone. Has not been taking lasix, will encourage it every other day. Limit sodium. Encouraged to have labs including CMP with you.     Hyperlipidemia: On statin therapy with mevacor. FLP with your office. Urged to have checked this spring. Limited carb diet urged.    Edema: On occasion, has been holding lasix. Will resume but every other day. Limit sodium.     Claudication: Improved. TARA negative.     DM: On DM med with glucophage. Also now on insulin. AIC not at goal. Encouraged to discuss mounjaro, wegovy with you.      Sleep apnea: Wearing CPAP therapy. Tolerates well.      Refills per request     BMI noted at 45.17, good cardiac ADA diet urged.     6 month follow up advised, sooner if needed.                  Problems Addressed this Visit          Cardiac and Vasculature    Primary hypertension    Relevant Medications    furosemide (LASIX) 40 MG tablet    amLODIPine (NORVASC) 10 MG tablet    irbesartan (AVAPRO) 150 MG tablet    sotalol (Betapace) 80 MG tablet    spironolactone (ALDACTONE) 25 MG tablet    Hyperlipidemia LDL goal <100    Relevant Medications    lovastatin (MEVACOR) 40 MG tablet     Other Visit Diagnoses         PAF (paroxysmal atrial fibrillation)    -  Primary    Relevant Medications    amLODIPine (NORVASC) 10 MG tablet    sotalol (Betapace) 80 MG tablet    Other Relevant Orders    ECG 12 Lead      LV dysfunction        Relevant Medications    amLODIPine (NORVASC) 10 MG tablet    sotalol (Betapace) 80 MG tablet      Edema leg          MERT (obstructive sleep apnea)          Type 2 diabetes mellitus without complication, without long-term current use of insulin          Morbid obesity with BMI of 45.0-49.9, adult              Diagnoses         Codes Comments      PAF (paroxysmal atrial fibrillation)    -  Primary ICD-10-CM: I48.0  ICD-9-CM: 427.31       Primary hypertension     ICD-10-CM: I10  ICD-9-CM: 401.9       Hyperlipidemia LDL goal <100     ICD-10-CM: E78.5  ICD-9-CM: 272.4       LV dysfunction     ICD-10-CM: I51.9  ICD-9-CM: 429.9       Edema leg     ICD-10-CM: R60.0  ICD-9-CM: 782.3       MERT (obstructive sleep apnea)     ICD-10-CM: G47.33  ICD-9-CM: 327.23       Type 2 diabetes mellitus without complication, without long-term current use of insulin     ICD-10-CM: E11.9  ICD-9-CM: 250.00       Morbid obesity with BMI of 45.0-49.9, adult     ICD-10-CM: E66.01, Z68.42  ICD-9-CM: 278.01, V85.42                         Electronically signed by Gerladine Mayfield, RADHA March 10, 2025 09:46 EDT

## 2025-04-29 ENCOUNTER — TELEPHONE (OUTPATIENT)
Dept: CARDIOLOGY | Facility: CLINIC | Age: 70
End: 2025-04-29
Payer: MEDICARE

## 2025-04-29 NOTE — TELEPHONE ENCOUNTER
Received fax from Dr. Pool for cardiac clearance for patient to have an exam under anesthesia with possible fistulotomy. Patient is on Eliquis and they are requesting to hold. According to our records, I do not see where patient has had any stenting. Patient has a history of PAF.          Fax [phone]